# Patient Record
Sex: FEMALE | Race: WHITE
[De-identification: names, ages, dates, MRNs, and addresses within clinical notes are randomized per-mention and may not be internally consistent; named-entity substitution may affect disease eponyms.]

---

## 2019-07-22 ENCOUNTER — HOSPITAL ENCOUNTER (EMERGENCY)
Dept: HOSPITAL 10 - E/R | Age: 49
Discharge: HOME | End: 2019-07-22
Payer: SELF-PAY

## 2019-07-22 ENCOUNTER — HOSPITAL ENCOUNTER (EMERGENCY)
Dept: HOSPITAL 91 - E/R | Age: 49
Discharge: HOME | End: 2019-07-22
Payer: SELF-PAY

## 2019-07-22 VITALS
HEIGHT: 61 IN | BODY MASS INDEX: 44.41 KG/M2 | HEIGHT: 61 IN | BODY MASS INDEX: 44.41 KG/M2 | WEIGHT: 235.23 LBS | WEIGHT: 235.23 LBS

## 2019-07-22 VITALS — SYSTOLIC BLOOD PRESSURE: 122 MMHG | DIASTOLIC BLOOD PRESSURE: 88 MMHG | HEART RATE: 79 BPM | RESPIRATION RATE: 18 BRPM

## 2019-07-22 DIAGNOSIS — R10.13: Primary | ICD-10-CM

## 2019-07-22 LAB
ADD MAN DIFF?: NO
ALANINE AMINOTRANSFERASE: 47 IU/L (ref 13–69)
ALBUMIN/GLOBULIN RATIO: 1
ALBUMIN: 4.3 G/DL (ref 3.3–4.9)
ALKALINE PHOSPHATASE: 118 IU/L (ref 42–121)
ANION GAP: 10 (ref 5–13)
ASPARTATE AMINO TRANSFERASE: 32 IU/L (ref 15–46)
BASOPHIL #: 0.1 10^3/UL (ref 0–0.1)
BASOPHILS %: 0.5 % (ref 0–2)
BILIRUBIN,DIRECT: 0 MG/DL (ref 0–0.2)
BILIRUBIN,TOTAL: 0.8 MG/DL (ref 0.2–1.3)
BLOOD UREA NITROGEN: 12 MG/DL (ref 7–20)
CALCIUM: 9.1 MG/DL (ref 8.4–10.2)
CARBON DIOXIDE: 29 MMOL/L (ref 21–31)
CHLORIDE: 104 MMOL/L (ref 97–110)
CREATININE: 0.65 MG/DL (ref 0.44–1)
EOSINOPHILS #: 0.2 10^3/UL (ref 0–0.5)
EOSINOPHILS %: 1.9 % (ref 0–7)
GLOBULIN: 4.3 G/DL (ref 1.3–3.2)
GLUCOSE: 118 MG/DL (ref 70–220)
HEMATOCRIT: 43.6 % (ref 37–47)
HEMOGLOBIN: 14.3 G/DL (ref 12–16)
IMMATURE GRANS #M: 0.04 10^3/UL (ref 0–0.03)
IMMATURE GRANS % (M): 0.4 % (ref 0–0.43)
LIPASE: 26 U/L (ref 23–300)
LYMPHOCYTES #: 2.7 10^3/UL (ref 0.8–2.9)
LYMPHOCYTES %: 25 % (ref 15–51)
MEAN CORPUSCULAR HEMOGLOBIN: 29.3 PG (ref 29–33)
MEAN CORPUSCULAR HGB CONC: 32.8 G/DL (ref 32–37)
MEAN CORPUSCULAR VOLUME: 89.3 FL (ref 82–101)
MEAN PLATELET VOLUME: 12.4 FL (ref 7.4–10.4)
MONOCYTE #: 0.6 10^3/UL (ref 0.3–0.9)
MONOCYTES %: 5.6 % (ref 0–11)
NEUTROPHIL #: 7.3 10^3/UL (ref 1.6–7.5)
NEUTROPHILS %: 66.6 % (ref 39–77)
NUCLEATED RED BLOOD CELLS #: 0 10^3/UL (ref 0–0)
NUCLEATED RED BLOOD CELLS%: 0 /100WBC (ref 0–0)
PLATELET COUNT: 282 10^3/UL (ref 140–415)
POTASSIUM: 3.6 MMOL/L (ref 3.5–5.1)
RED BLOOD COUNT: 4.88 10^6/UL (ref 4.2–5.4)
RED CELL DISTRIBUTION WIDTH: 12.6 % (ref 11.5–14.5)
SODIUM: 143 MMOL/L (ref 135–144)
TOTAL PROTEIN: 8.6 G/DL (ref 6.1–8.1)
URINE PH (DIP) POC: 7 (ref 5–8.5)
WHITE BLOOD COUNT: 10.9 10^3/UL (ref 4.8–10.8)

## 2019-07-22 PROCEDURE — 96374 THER/PROPH/DIAG INJ IV PUSH: CPT

## 2019-07-22 PROCEDURE — 81025 URINE PREGNANCY TEST: CPT

## 2019-07-22 PROCEDURE — 83690 ASSAY OF LIPASE: CPT

## 2019-07-22 PROCEDURE — 99284 EMERGENCY DEPT VISIT MOD MDM: CPT

## 2019-07-22 PROCEDURE — 81003 URINALYSIS AUTO W/O SCOPE: CPT

## 2019-07-22 PROCEDURE — 85025 COMPLETE CBC W/AUTO DIFF WBC: CPT

## 2019-07-22 PROCEDURE — 80053 COMPREHEN METABOLIC PANEL: CPT

## 2019-07-22 PROCEDURE — 36415 COLL VENOUS BLD VENIPUNCTURE: CPT

## 2019-07-22 RX ADMIN — ALUMINUM HYDROXIDE, MAGNESIUM HYDROXIDE, DIMETHICONE 1 ML: 200; 200; 20 SUSPENSION ORAL at 13:45

## 2019-07-22 RX ADMIN — FAMOTIDINE 1 MG: 10 INJECTION, SOLUTION INTRAVENOUS at 13:45

## 2019-07-22 NOTE — ERD
ER Documentation


Chief Complaint


Chief Complaint





abdominal pain x 4 days





HPI


The patient is a 49-year-old female, presenting to the ER because of epigastric 


abdominal pain intermittently for the last 4days, had similar symptoms previ


ously, worse with eating, denies fever, chills, neck pain, chest pain, dyspnea, 


vomiting, dysuria, diarrhea.  She does smoke, drinks socially





Past medical history: Gastritis, GERD


Past surgical history: Cholecystectomy, tubal ligation





ROS


All systems reviewed and are negative except as per history of present illness.





Medications


Home Meds


Active Scripts


Pantoprazole* (Protonix*) 40 Mg Tablet., 40 MG PO DAILY, #20 TAB


   Prov:JAZMIN LYN MD         7/22/19





Allergies


Allergies:  


Coded Allergies:  


     No Known Allergy (Unverified , 7/22/19)





Physical Exam


Vitals





Vital Signs


  Date      Temp  Pulse  Resp  B/P (MAP)   Pulse Ox  O2          O2 Flow    FiO2


Time                                                 Delivery    Rate


   7/22/19  98.0     79    18      122/88        98  Room Air


     14:41                           (99)


   7/22/19           90    17      133/77        98  Room Air


     13:46                           (95)


   7/22/19  97.6    112    18      144/89        97


     12:57                          (107)





Physical Exam


 Const:      No acute distress.


 Head:        Atraumatic.


 Eyes:       Normal Conjunctiva.


 ENT:         Normal External Ears, Nose and Mouth.


 Neck:        Full range of motion.  No meningismus.


 Resp:         Clear to auscultation bilaterally.


 Cardio:       Regular rate and rhythm.


 Abd:         Soft,  non distended, normal bowel sounds, non tender.


 Skin:         No petechiae or rashes.


 Back:        No midline or flank tenderness.


 Ext:          No cyanosis, or edema.


 Neur:        Awake and alert. No focal deficit


 Psych:        Normal Mood and Affect.


Result Diagram:  


7/22/19 1324                                                                    


           7/22/19 1324





Results 24 hrs





Laboratory Tests


Test
                                7/22/19
13:24  7/22/19
13:47  7/22/19
13:48


White Blood Count                    10.9 10^3/ul


Red Blood Count                      4.88 10^6/ul


Hemoglobin                              14.3 g/dl


Hematocrit                                 43.6 %


Mean Corpuscular Volume                   89.3 fl


Mean Corpuscular Hemoglobin               29.3 pg


Mean Corpuscular                       32.8 g/dl 
  
              



Hemoglobin
Concent


Red Cell Distribution Width                12.6 %


Platelet Count                        282 10^3/UL


Mean Platelet Volume                      12.4 fl


Immature Granulocytes %                   0.400 %


Neutrophils %                              66.6 %


Lymphocytes %                              25.0 %


Monocytes %                                 5.6 %


Eosinophils %                               1.9 %


Basophils %                                 0.5 %


Nucleated Red Blood Cells %           0.0 /100WBC


Immature Granulocytes #             0.040 10^3/ul


Neutrophils #                         7.3 10^3/ul


Lymphocytes #                         2.7 10^3/ul


Monocytes #                           0.6 10^3/ul


Eosinophils #                         0.2 10^3/ul


Basophils #                           0.1 10^3/ul


Nucleated Red Blood Cells #           0.0 10^3/ul


Sodium Level                           143 mmol/L


Potassium Level                        3.6 mmol/L


Chloride Level                         104 mmol/L


Carbon Dioxide Level                    29 mmol/L


Anion Gap                                      10


Blood Urea Nitrogen                      12 mg/dl


Creatinine                             0.65 mg/dl


Est Glomerular Filtrat Rate
mL/min  > 60 mL/min 
   
              



Glucose Level                           118 mg/dl


Calcium Level                           9.1 mg/dl


Total Bilirubin                         0.8 mg/dl


Direct Bilirubin                       0.00 mg/dl


Indirect Bilirubin                      0.8 mg/dl


Aspartate Amino Transf
(AST/SGOT)        32 IU/L 
  
              



Alanine                                  47 IU/L 
  
              



Aminotransferase
(ALT/SGPT)


Alkaline Phosphatase                     118 IU/L


Total Protein                            8.6 g/dl


Albumin                                  4.3 g/dl


Globulin                                4.30 g/dl


Albumin/Globulin Ratio                       1.00


Lipase                                     26 U/L


POC Beta HCG, Qualitative                           NEGATIVE


Bedside Urine pH (LAB)                                                      7.0


Bedside Urine Protein (LAB)                                        Negative


Bedside Urine Glucose (UA)                                         Negative


Bedside Urine Ketones (LAB)                                        Negative


Bedside Urine Blood                                                Trace-lysed


Bedside Urine Nitrite (LAB)                                        Negative


Bedside Urine Leukocyte
Esterase    
               
              Negative 



(L





Current Medications


 Medications
   Dose
          Sig/Dave
       Start Time
   Status  Last


 (Trade)       Ordered        Route
 PRN     Stop Time              Admin
Dose


                              Reason                                Admin


 Famotidine
    20 mg          ONCE  ONCE
    7/22/19       DC           7/22/19


(Pepcid Iv)                   IV
            13:30
                       13:45



                                             7/22/19 13:32


                40 ml          ONCE  ONCE
    7/22/19       DC           7/22/19


Miscellaneous                 PO
            13:30
                       13:45




 Medication
                                7/22/19 13:32


 (Gi Cocktail


(2))








Procedures/MDM


MEDICAL MAKING DECISION: The patient is a 49-year-old female, presenting with 


epigastric abdominal pain, unclear etiology, treated with Pepcid IV and GI 


cocktail with good response, is stable for  outpatient follow-up





The differential diagnoses considered include but are not limited to c


holelithiasis, cholecystitis,  choledocholithiasis, cholangitis, pancreatitis, 


hepatitis, gastritis, peptic ulcer disease, gastric ulcer, appendicitis,  


cystitis,  diverticulitis, partial small bowel obstruction.





Departure


Diagnosis:  


   Primary Impression:  


   Abdominal pain


Condition:  Good


Comments


She was treated with Protonix





The patient's blood pressure was elevated (>120/80) but appears stable without 


evidence of hypertension emergency or urgency.  The patient was counseled about 


the risks of hypertension and urged to pursue outpatient monitoring and therapy 


within a week with their primary care physician.











I discussed the findings with the patient. I advised the patient to follow-up 


with the primary physician in about 1-2 days, sooner if needed and return if any


concern for reevaluation and referral to gastroenterology, possible endoscopy.





Disclaimer: Inadvertent spelling and grammatical errors are likely due to 


EHR/dictation software use and do not reflect on the overall quality of patient 


care. Also, please note that the electronic time recorded on this note does not 


necessarily reflect the actual time of the patient encounter.











JAZMIN LYN MD              Jul 22, 2019 13:13